# Patient Record
(demographics unavailable — no encounter records)

---

## 2018-05-30 NOTE — PRG
DATE OF SERVICE:  05/30/2018

 

CHIEF COMPLAINT:  Headache.

 

PRIMARY OB:  None.

 

HISTORY OF PRESENT ILLNESS:  The patient is a 26-year-old G1, P0 female with an intrauterine pregnanc
y at 22 weeks who is presenting with a 3 day history of migraine headache.  The patient reports that 
she has headaches on occasion and that she feels mainly behind her left eye.  This has not gone away 
and the patient has presented with concerns that she may be developing preeclampsia.  The patient rep
orts light sensitivity.  She denies vomiting, though has been having nausea throughout the pregnancy.
  She denies uterine contractions, vaginal bleeding, leakage of fluid.  She denies fever, chest pain,
 shortness of breath, diarrhea or constipation, any new rashes and any urinary symptoms.

 

PAST MEDICAL HISTORY:  Migraines, left lazy eye, vocal cord dysfunction, history of kidney stones, an
xiety, bipolar disorder.

 

PAST SURGICAL HISTORY:  Noncontributory.

 

SOCIAL HISTORY:  Denies drug, alcohol or tobacco use.

 

ALLERGIES:  TRAMADOL.

 

MEDICATIONS:  Prenatal vitamins.

 

OB LABS:  Unavailable.

 

REVIEW OF SYSTEMS:  Per HPI.

 

PHYSICAL EXAMINATION:

VITAL SIGNS:  Initial blood pressure 142/65 and 148/91.  Within the first 30 minutes of arrival; teresa
jacque, since arrival, over the following 4 hours, the patient has had normal blood pressures in the 1-t
eens, to 120s over 60s with a heart rate in the 70s, respiratory 18, temperature 98.0.

GENERAL:  The patient appears to be in no acute distress.  She is alert and oriented, cooperative and
 pleasant to interact with.

HEENT:  Normocephalic, atraumatic.

LUNGS:  Clear to auscultation bilaterally.

HEART:  Regular rate and rhythm.

ABDOMEN:  Soft, nontender and gravid.

EXTREMITIES:  Nontender.

GENITOURINARY:  Has been deferred.

 

Fetal heart tracing by Doppler, the patient's fetal heart tones are dopplered in the 140s.  Tocometer
 had no contractions.

 

Urinalysis was negative for protein, nitrites or leukocyte esterase or bacteria.

 

ASSESSMENT AND PLAN:  The patient is a 26-year-old G1, P0 female with an intrauterine pregnancy at 22
 weeks who has not established prenatal care here in the community, who presents with a 3-day history
 of migraine.  The patient has no evidence of preeclampsia.  No elevated blood pressures, no proteinu
glenis.  She has been treated with a series of Reglan and Benadryl IV for her migraine which has since r
esolved.  The patient is being discharged to home.  She has an appointment made to establish care [facility] Dr. Pearl which we have encouraged her to keep.